# Patient Record
Sex: FEMALE | Race: WHITE | URBAN - METROPOLITAN AREA
[De-identification: names, ages, dates, MRNs, and addresses within clinical notes are randomized per-mention and may not be internally consistent; named-entity substitution may affect disease eponyms.]

---

## 2017-07-05 ENCOUNTER — TRANSFERRED RECORDS (OUTPATIENT)
Dept: HEALTH INFORMATION MANAGEMENT | Facility: CLINIC | Age: 42
End: 2017-07-05

## 2018-01-05 NOTE — TELEPHONE ENCOUNTER
Received Imaging From: [facility]    Image Type (x): Disc:___  Pacs:_x_      Exam Date/Name: XR R Knee 7/5/17  XR L Hip 7/5/17  XR L Hip Injection 9/15/17  MRI L Hip 8/9/17     Comments: henriied Cely to pull

## 2018-01-05 NOTE — TELEPHONE ENCOUNTER
APPT INFO    Date /Time: 1/24/18 3PM   Reason for Appt: Bilat Secondary OA of Rt Knee   Ref Provider/Clinic: Dr. Lloyd Cheatham   Are there internal records? Yes/No?  IF YES, list clinic names: no   Are there outside records? Yes/No? Yes - see below   Patient Contact (Y/N) & Call Details: No - pt is referred   Action: CareEverywhere records reviewed. See CareEverywhere Tab.       OUTSIDE RECORDS CHECKLIST     CLINIC NAME COMMENTS REC (x) IMG (x)   [facility]    (CARE EVERYWHERE) OFFICE NOTES: 7/5/17, 8/1/17, 11/6/17, 12/4/17    RADIOLOGY:   XR R Knee 7/5/17  XR L Hip 7/5/17  XR L Hip Injection 9/15/17  MRI L Hip 8/9/17      X X

## 2018-01-12 DIAGNOSIS — M25.562 BILATERAL KNEE PAIN: Primary | ICD-10-CM

## 2018-01-12 DIAGNOSIS — M25.561 BILATERAL KNEE PAIN: Primary | ICD-10-CM

## 2018-01-23 ASSESSMENT — ENCOUNTER SYMPTOMS
MUSCLE CRAMPS: 0
TINGLING: 0
LOSS OF CONSCIOUSNESS: 0
MYALGIAS: 1
SEIZURES: 0
HEADACHES: 1
STIFFNESS: 1
MEMORY LOSS: 0
TREMORS: 0
PARALYSIS: 0
NECK PAIN: 1
NUMBNESS: 0
DISTURBANCES IN COORDINATION: 0
JOINT SWELLING: 0
DIZZINESS: 0
BACK PAIN: 1
SPEECH CHANGE: 0
WEAKNESS: 0
ARTHRALGIAS: 1
MUSCLE WEAKNESS: 0

## 2018-01-23 ASSESSMENT — ACTIVITIES OF DAILY LIVING (ADL): FUNCTION,_DAILY_LIVING_SCORE: 45.58

## 2018-01-23 ASSESSMENT — KOOS JR: HOW SEVERE IS YOUR KNEE STIFFNESS AFTER FIRST WAKING IN MORNING: MODERATE

## 2018-01-24 ENCOUNTER — OFFICE VISIT (OUTPATIENT)
Dept: ORTHOPEDICS | Facility: CLINIC | Age: 43
End: 2018-01-24
Payer: COMMERCIAL

## 2018-01-24 ENCOUNTER — PRE VISIT (OUTPATIENT)
Dept: ORTHOPEDICS | Facility: CLINIC | Age: 43
End: 2018-01-24

## 2018-01-24 VITALS — HEIGHT: 66 IN | BODY MASS INDEX: 37.93 KG/M2 | WEIGHT: 236 LBS

## 2018-01-24 DIAGNOSIS — M17.0 PRIMARY OSTEOARTHRITIS OF BOTH KNEES: Primary | ICD-10-CM

## 2018-01-24 PROBLEM — M17.31 POST-TRAUMATIC OSTEOARTHRITIS OF RIGHT KNEE: Status: ACTIVE | Noted: 2017-07-05

## 2018-01-24 PROBLEM — F40.240 CLAUSTROPHOBIA: Status: ACTIVE | Noted: 2017-08-01

## 2018-01-24 PROBLEM — M70.62 TROCHANTERIC BURSITIS OF LEFT HIP: Status: ACTIVE | Noted: 2017-07-05

## 2018-01-24 RX ORDER — SUMATRIPTAN 100 MG/1
TABLET, FILM COATED ORAL
COMMUNITY
Start: 2017-09-05

## 2018-01-24 RX ORDER — LEVONORGESTREL AND ETHINYL ESTRADIOL 0.15-0.03
KIT ORAL
COMMUNITY
Start: 2017-12-30

## 2018-01-24 NOTE — NURSING NOTE
"Reason For Visit:   Chief Complaint   Patient presents with     Consult     Bilateral knee and bilateral hip osteoarthritis.        Primary MD: Xuan Bowen        Ht 1.676 m (5' 6\")  Wt 107 kg (236 lb)  BMI 38.09 kg/m2      Current Outpatient Prescriptions   Medication     CITALOPRAM HYDROBROMIDE PO     levonorgestrel-ethinyl estradiol (SETLAKIN) 0.15-0.03 MG per tablet     SUMAtriptan (IMITREX) 100 MG tablet     No current facility-administered medications for this visit.           Allergies   Allergen Reactions     Morphine Nausea and Vomiting           Rochelle Langston LPN    "

## 2018-01-24 NOTE — MR AVS SNAPSHOT
After Visit Summary   1/24/2018    Xuan Harris    MRN: [id number]           Patient Information     Date Of Birth          1975        Visit Information        Provider Department      1/24/2018 3:00 PM Henri Vila MD [facility]        Today's Diagnoses     Primary osteoarthritis of both knees    -  1       Follow-ups after your visit        Additional Services     PHYSICAL THERAPY REFERRAL (External-Prints)       Physical Therapy Referral                  Who to contact     Please call your clinic at [phone] to:    Ask questions about your health    Make or cancel appointments    Discuss your medicines    Learn about your test results    Speak to your doctor   If you have compliments or concerns about an experience at your clinic, or if you wish to file a complaint, please contact [facility] Patient Relations at [phone] or email us at [email or web address]         Additional Information About Your Visit        MyChart Information     ????t gives you secure access to your electronic health record. If you see a primary care provider, you can also send messages to your care team and make appointments. If you have questions, please call your primary care clinic.  If you do not have a primary care provider, please call [phone] and they will assist you.      [facility] is an electronic gateway that provides easy, online access to your medical records. With [facility], you can request a clinic appointment, read your test results, renew a prescription or communicate with your care team.     To access your existing account, please contact your [facility] Clinic or call [phone] for assistance.        Care EveryWhere ID     This is your Care EveryWhere ID. This could be used by other organizations to access your [place] medical records  [id number]        Your Vitals Were     Height BMI (Body Mass  "Index)                1.676 m (5' 6\") 38.09 kg/m2           Blood Pressure from Last 3 Encounters:   No data found for BP    Weight from Last 3 Encounters:   01/24/18 107 kg (236 lb)              We Performed the Following     PHYSICAL THERAPY REFERRAL (External-Prints)        Primary Care Provider Office Phone # Fax #    Xuan Bowen [phone]       [facility] [address]  [place] [address]        Equal Access to Services     ZENAIDA HUMPHRIES : Hadii aad ku hadasho Soomaali, waaxda luqadaha, qaybta kaalmada adeegyada, waxay idiin hayaan adeeg kharash la'sunnin . So [facility] [phone].    ATENCIÓN: Si habla español, tiene a hernandez disposición servicios gratuitos de asistencia lingüística. [facility] [phone].    We comply with applicable federal civil rights laws and [place] laws. We do not discriminate on the basis of race, color, national origin, age, disability, sex, sexual orientation, or gender identity.            Thank you!     Thank you for choosing [facility]  for your care. Our goal is always to provide you with excellent care. Hearing back from our patients is one way we can continue to improve our services. Please take a few minutes to complete the written survey that you may receive in the mail after your visit with us. Thank you!             Your Updated Medication List - Protect others around you: Learn how to safely use, store and throw away your medicines at www.disposemymeds.org.          This list is accurate as of 1/24/18  4:19 PM.  Always use your most recent med list.                   Brand Name Dispense Instructions for use Diagnosis    CITALOPRAM HYDROBROMIDE PO           SETLAKIN 0.15-0.03 MG per tablet   Generic drug:  levonorgestrel-ethinyl estradiol           SUMAtriptan 100 MG tablet    IMITREX            "

## 2018-01-24 NOTE — LETTER
1/24/2018       RE: Xuan Harris  [facility] [facility] [address]     Dear Colleague,    Thank you for referring your patient, Xuan Harris, to the [facility] at [facility]. Please see a copy of my visit note below.    [facility], Orthopaedic Surgery, Arthritis, Hip and Knee Replacement    Xuan Harris MRN# [id number]   Age: 42 year old YOB: 1975     Requesting physician: Lloyd Cheatham              History of Present Illness:   Xuan Harris is a 42 year old year old female who presents today for evaluation and management of bilateral knee and left hip pain.    Xuan is a very pleasant 42-year-old woman with bilateral knee and left hip pain. With regards to her hip she notes that the pain is in the groin as well as the lateral aspect of her hip. She has been doing some strengthening on her own. She has also had a prior left hip intra-articular injection, which helped with the symptoms for a few months. She deals with the symptoms quite well and is still able to do activities like playing volleyball and softball.    She also notes bilateral knee pain. She had a left anterior cruciate ligament reconstruction in 1993. She localizes the pain to the medial aspect of her bilateral knees. The pain is worse with weightbearing activities. She has not done any formal physical therapy. She had prior intra-articular injections which helped with the pain temporarily. Otherwise she has been doing ice, rest, heat and Tylenol. There are occasional times and the pain is very severe and greater limits her ability to do normal weightbearing activities.           Past Medical History:     Patient Active Problem List   Diagnosis     Arthritis of both knees     Claustrophobia     Family history of malignant neoplasm of breast     Headache     Observation for suspected cardiovascular disease     Migraine without aura and without status migrainosus, not  "intractable     Post-traumatic osteoarthritis of right knee     Trochanteric bursitis of left hip     Twin pregnancy, delivered     No past medical history on file.  Prior history of blood clot: none  Prior history of bleeding problems: none  Prior history of anesthetic complications: none  Currently on opioids:  none  History of Diabetes: no           Past Surgical History:   No past surgical history on file.         Social History:     Social History     Social History     Marital status:      Spouse name: N/A     Number of children: N/A     Years of education: N/A     Occupational History     Not on file.     Social History Main Topics     Smoking status: Not on file     Smokeless tobacco: Not on file     Alcohol use Not on file     Drug use: Not on file     Sexual activity: Not on file     Other Topics Concern     Not on file     Social History Narrative       Non smoker  Works in [employer].           Family History:     No family history on file.    Father with knee replacements.         Medications:     Current Outpatient Prescriptions   Medication Sig     CITALOPRAM HYDROBROMIDE PO      levonorgestrel-ethinyl estradiol (SETLAKIN) 0.15-0.03 MG per tablet      SUMAtriptan (IMITREX) 100 MG tablet      No current facility-administered medications for this visit.             Review of Systems:   A comprehensive 10 point review of systems (constitutional, ENT, cardiac, peripheral vascular, lymphatic, respiratory, GI, , Musculoskeletal, skin, Neurological) was performed and found to be negative except as described in this note.     Also see intake form completed by patient.             Physical Exam:     EXAMINATION pertinent findings:   VITAL SIGNS: Height 1.676 m (5' 6\"), weight 107 kg (236 lb).  Body mass index is 38.09 kg/(m^2).  GEN: AOx3, cooperative, no distress  RESP: non labored breathing   ABD: benign   SKIN: grossly normal   LYMPHATIC: grossly normal   NEURO: grossly normal "   VASCULAR: satisfactory perfusion of all extremities  MUSCULOSKELETAL:   Gait: patient walks with a normal gait.     right painful  Knee  Overall limb alignment is: Neutral  Effusion or swelling of the knee: mild  Tenderness to palpation: yes, pain localizes to the medial joint line - no lateral or PF tenderness  ROM: 0 to 130  Pain with knee ROM: mild  Extensor lag: none  MCL stability: Pseudolaxity to valgus stress with firm end point  Lateral Stability: Stable  Lachman: Stable  Posterior stability: Stable  Pain with passive full hip range of motion: none  Prior surgical incision: none    left painful  Knee  Overall limb alignment is: Neutral  Effusion or swelling of the knee: mild  Tenderness to palpation: yes, pain localizes to the medial joint line - no PF or lateral TTP  ROM: 0 to 130  Pain with knee ROM: mild  Extensor lag: none  MCL stability: Pseudolaxity to valgus stress with firm end point  Lateral Stability: Stable  Lachman: Stable  Posterior stability: Stable  Pain with passive full hip range of motion: moderate pain localized to the groin with flexion and IR  Prior surgical incision: none    Motor: normal ehl/fhl/gs/at strength  Sensory: normal sensation in DP/SP/T/S/S distributions  Vascular: 2+ PT pulse           Data:   Imaging:   Plain radiographs of her knees were reviewed which demonstrate bilateral medial compartment arthritis. She also has mild left hip joint space narrowing consistent with early left hip arthritis.           Assessment and Plan:   Assessment:  Xuan is very pleasant 42-year-old woman with bilateral knee arthritis as well as hip arthritis. We discussed ongoing management options. Given her young age recommended optimizing all nonsurgical treatments. We discussed that she certainly could consider another injection given the pain relief she had. We also discussed the importance of working on maintaining a healthy active lifestyle and weight management. She will pursue this as  well. We discussed that if her symptoms got to the point where she has severe debilitating pain that limits her from doing normal activities of daily living, we could consider further surgical treatment options such as partial versus total knee replacement. With regards to her hip she does seem to have an aspect of bursitis as well as intra-articular pathology. I recommended she do a course of physical therapy for the hip and the knees. I also gave her exercises that she could begin doing on her own. She is in agreement with this treatment plan and will keep us updated. Her symptoms are worsening she return to clinic to discuss other ongoing management options.          Henri Vila M.D.     Arthritis and Joint Replacement  Department of Orthopaedic Surgery, [facility]  [email or web address]  [phone] (pager)           Review of Systems:     KOOS Knee Survey Assessment    Knee Outcome Survey ADL Scale (MI Bernard; RANDAL Bonilla; Manjit, FRANCOISE; Duke, FH; ELIEL Cisneros; 1998) 1/23/2018   Do you have swelling in your knee? Sometimes   Do you feel grinding, hear clicking or any other type of noise when your knee moves? Always   Does your knee catch or hang up when moving? Sometimes   Can you straighten your knee fully? Always   Can you bend your knee fully? Never   How severe is your knee joint stiffness after first wakening in the morning? Moderate   How severe is your knee stiffness after sitting, lying or resting LATER IN THE DAY? Moderate   How often do you experience knee pain? Daily   Twisting/pivoting on your knee Moderate   Straightening knee fully None   Bending knee fully Mild   Walking on flat surface Mild   Going up or down stairs Severe   At night while in bed Severe   Sitting or lying None   Standing upright Mild   Descending stairs Severe   Ascending stairs Moderate   Rising from sitting Severe   Standing Mild   Bending to floor/ an object Severe   Walking on flat  surface Mild   Getting in/out of car Severe   Going shopping Mild   Putting on socks/stockings Moderate   Rising from bed Moderate   Taking off socks/stockings Moderate   Lying in bed (turning over, maintaining knee position) Moderate   Getting in/out of bath Severe   Sitting Moderate   Getting on/off toilet Moderate   Heavy domestic duties (moving heavy boxes, scrubbing floors, etc) Severe   Light domestic duties (cooking, dusting, etc) Moderate   Squatting Extreme   Running Severe   Jumping Severe   Twisting/pivoting on your injured knee Severe   Kneeling Extreme   How often are you aware of your knee problem? Daily   Have you modified your life style to avoid potentially damaging activities to your knee? Moderately   How much are you troubled with lack of confidence in your knee? Severely   In general, how much difficulty do you have with your knee? Severe   Pain Score 61.11   Symptoms Score 42.85   Function, Daily Living Score 45.58   Sports and Rec Score 15   Quality of Life Score 31.25       Again, thank you for allowing me to participate in the care of your patient.      Sincerely,    Henri Vila MD

## 2018-01-24 NOTE — PROGRESS NOTES
[facility], Orthopaedic Surgery, Arthritis, Hip and Knee Replacement    Xuan Harris MRN# [id number]   Age: 42 year old YOB: 1975     Requesting physician: Lloyd Cheatham              History of Present Illness:   Xuan Harris is a 42 year old year old female who presents today for evaluation and management of bilateral knee and left hip pain.    Xuan is a very pleasant 42-year-old woman with bilateral knee and left hip pain. With regards to her hip she notes that the pain is in the groin as well as the lateral aspect of her hip. She has been doing some strengthening on her own. She has also had a prior left hip intra-articular injection, which helped with the symptoms for a few months. She deals with the symptoms quite well and is still able to do activities like playing volleyball and softball.    She also notes bilateral knee pain. She had a left anterior cruciate ligament reconstruction in 1993. She localizes the pain to the medial aspect of her bilateral knees. The pain is worse with weightbearing activities. She has not done any formal physical therapy. She had prior intra-articular injections which helped with the pain temporarily. Otherwise she has been doing ice, rest, heat and Tylenol. There are occasional times and the pain is very severe and greater limits her ability to do normal weightbearing activities.           Past Medical History:     Patient Active Problem List   Diagnosis     Arthritis of both knees     Claustrophobia     Family history of malignant neoplasm of breast     Headache     Observation for suspected cardiovascular disease     Migraine without aura and without status migrainosus, not intractable     Post-traumatic osteoarthritis of right knee     Trochanteric bursitis of left hip     Twin pregnancy, delivered     No past medical history on file.  Prior history of blood clot: none  Prior history of bleeding problems: none  Prior history of anesthetic  "complications: none  Currently on opioids:  none  History of Diabetes: no           Past Surgical History:   No past surgical history on file.         Social History:     Social History     Social History     Marital status:      Spouse name: N/A     Number of children: N/A     Years of education: N/A     Occupational History     Not on file.     Social History Main Topics     Smoking status: Not on file     Smokeless tobacco: Not on file     Alcohol use Not on file     Drug use: Not on file     Sexual activity: Not on file     Other Topics Concern     Not on file     Social History Narrative       Non smoker  Works in [employer].           Family History:     No family history on file.    Father with knee replacements.         Medications:     Current Outpatient Prescriptions   Medication Sig     CITALOPRAM HYDROBROMIDE PO      levonorgestrel-ethinyl estradiol (SETLAKIN) 0.15-0.03 MG per tablet      SUMAtriptan (IMITREX) 100 MG tablet      No current facility-administered medications for this visit.             Review of Systems:   A comprehensive 10 point review of systems (constitutional, ENT, cardiac, peripheral vascular, lymphatic, respiratory, GI, , Musculoskeletal, skin, Neurological) was performed and found to be negative except as described in this note.     Also see intake form completed by patient.             Physical Exam:     EXAMINATION pertinent findings:   VITAL SIGNS: Height 1.676 m (5' 6\"), weight 107 kg (236 lb).  Body mass index is 38.09 kg/(m^2).  GEN: AOx3, cooperative, no distress  RESP: non labored breathing   ABD: benign   SKIN: grossly normal   LYMPHATIC: grossly normal   NEURO: grossly normal   VASCULAR: satisfactory perfusion of all extremities  MUSCULOSKELETAL:   Gait: patient walks with a normal gait.     right painful  Knee  Overall limb alignment is: Neutral  Effusion or swelling of the knee: mild  Tenderness to palpation: yes, pain localizes to the medial " joint line - no lateral or PF tenderness  ROM: 0 to 130  Pain with knee ROM: mild  Extensor lag: none  MCL stability: Pseudolaxity to valgus stress with firm end point  Lateral Stability: Stable  Lachman: Stable  Posterior stability: Stable  Pain with passive full hip range of motion: none  Prior surgical incision: none    left painful  Knee  Overall limb alignment is: Neutral  Effusion or swelling of the knee: mild  Tenderness to palpation: yes, pain localizes to the medial joint line - no PF or lateral TTP  ROM: 0 to 130  Pain with knee ROM: mild  Extensor lag: none  MCL stability: Pseudolaxity to valgus stress with firm end point  Lateral Stability: Stable  Lachman: Stable  Posterior stability: Stable  Pain with passive full hip range of motion: moderate pain localized to the groin with flexion and IR  Prior surgical incision: none    Motor: normal ehl/fhl/gs/at strength  Sensory: normal sensation in DP/SP/T/S/S distributions  Vascular: 2+ PT pulse           Data:   Imaging:   Plain radiographs of her knees were reviewed which demonstrate bilateral medial compartment arthritis. She also has mild left hip joint space narrowing consistent with early left hip arthritis.           Assessment and Plan:   Assessment:  Xuan is very pleasant 42-year-old woman with bilateral knee arthritis as well as hip arthritis. We discussed ongoing management options. Given her young age recommended optimizing all nonsurgical treatments. We discussed that she certainly could consider another injection given the pain relief she had. We also discussed the importance of working on maintaining a healthy active lifestyle and weight management. She will pursue this as well. We discussed that if her symptoms got to the point where she has severe debilitating pain that limits her from doing normal activities of daily living, we could consider further surgical treatment options such as partial versus total knee replacement. With regards to  her hip she does seem to have an aspect of bursitis as well as intra-articular pathology. I recommended she do a course of physical therapy for the hip and the knees. I also gave her exercises that she could begin doing on her own. She is in agreement with this treatment plan and will keep us updated. Her symptoms are worsening she return to clinic to discuss other ongoing management options.          Henri Vila M.D.     Arthritis and Joint Replacement  Department of Orthopaedic Surgery, [facility]  [email or web address]  [phone] (pager)           Review of Systems:     KOOS Knee Survey Assessment    Knee Outcome Survey ADL Scale (Joana, MI; RANDAL Bonilla; Manjit, RS; Duke, CASH; ELIEL Cisneros; Priti) 1/23/2018   Do you have swelling in your knee? Sometimes   Do you feel grinding, hear clicking or any other type of noise when your knee moves? Always   Does your knee catch or hang up when moving? Sometimes   Can you straighten your knee fully? Always   Can you bend your knee fully? Never   How severe is your knee joint stiffness after first wakening in the morning? Moderate   How severe is your knee stiffness after sitting, lying or resting LATER IN THE DAY? Moderate   How often do you experience knee pain? Daily   Twisting/pivoting on your knee Moderate   Straightening knee fully None   Bending knee fully Mild   Walking on flat surface Mild   Going up or down stairs Severe   At night while in bed Severe   Sitting or lying None   Standing upright Mild   Descending stairs Severe   Ascending stairs Moderate   Rising from sitting Severe   Standing Mild   Bending to floor/ an object Severe   Walking on flat surface Mild   Getting in/out of car Severe   Going shopping Mild   Putting on socks/stockings Moderate   Rising from bed Moderate   Taking off socks/stockings Moderate   Lying in bed (turning over, maintaining knee position) Moderate   Getting in/out of bath Severe   Sitting  Moderate   Getting on/off toilet Moderate   Heavy domestic duties (moving heavy boxes, scrubbing floors, etc) Severe   Light domestic duties (cooking, dusting, etc) Moderate   Squatting Extreme   Running Severe   Jumping Severe   Twisting/pivoting on your injured knee Severe   Kneeling Extreme   How often are you aware of your knee problem? Daily   Have you modified your life style to avoid potentially damaging activities to your knee? Moderately   How much are you troubled with lack of confidence in your knee? Severely   In general, how much difficulty do you have with your knee? Severe   Pain Score 61.11   Symptoms Score 42.85   Function, Daily Living Score 45.58   Sports and Rec Score 15   Quality of Life Score 31.25        Answers for HPI/ROS submitted by the patient on 1/23/2018   General Symptoms: No  Skin Symptoms: No  HENT Symptoms: No  EYE SYMPTOMS: No  HEART SYMPTOMS: No  LUNG SYMPTOMS: No  INTESTINAL SYMPTOMS: No  URINARY SYMPTOMS: No  GYNECOLOGIC SYMPTOMS: No  BREAST SYMPTOMS: No  SKELETAL SYMPTOMS: Yes  BLOOD SYMPTOMS: No  NERVOUS SYSTEM SYMPTOMS: Yes  MENTAL HEALTH SYMPTOMS: No  Back pain: Yes  Muscle aches: Yes  Neck pain: Yes  Swollen joints: No  Joint pain: Yes  Bone pain: No  Muscle cramps: No  Muscle weakness: No  Joint stiffness: Yes  Bone fracture: No  Trouble with coordination: No  Dizziness or trouble with balance: No  Fainting or black-out spells: No  Memory loss: No  Headache: Yes  Seizures: No  Speech problems: No  Tingling: No  Tremor: No  Weakness: No  Difficulty walking: No  Paralysis: No  Numbness: No

## 2018-01-28 ENCOUNTER — HEALTH MAINTENANCE LETTER (OUTPATIENT)
Age: 43
End: 2018-01-28

## 2020-03-11 ENCOUNTER — HEALTH MAINTENANCE LETTER (OUTPATIENT)
Age: 45
End: 2020-03-11

## 2020-12-27 ENCOUNTER — HEALTH MAINTENANCE LETTER (OUTPATIENT)
Age: 45
End: 2020-12-27

## 2021-03-06 ENCOUNTER — HEALTH MAINTENANCE LETTER (OUTPATIENT)
Age: 46
End: 2021-03-06

## 2021-04-25 ENCOUNTER — HEALTH MAINTENANCE LETTER (OUTPATIENT)
Age: 46
End: 2021-04-25

## 2021-10-09 ENCOUNTER — HEALTH MAINTENANCE LETTER (OUTPATIENT)
Age: 46
End: 2021-10-09

## 2022-03-26 ENCOUNTER — HEALTH MAINTENANCE LETTER (OUTPATIENT)
Age: 47
End: 2022-03-26

## 2022-05-21 ENCOUNTER — HEALTH MAINTENANCE LETTER (OUTPATIENT)
Age: 47
End: 2022-05-21

## 2022-09-17 ENCOUNTER — HEALTH MAINTENANCE LETTER (OUTPATIENT)
Age: 47
End: 2022-09-17

## 2023-05-06 ENCOUNTER — HEALTH MAINTENANCE LETTER (OUTPATIENT)
Age: 48
End: 2023-05-06

## 2023-06-04 ENCOUNTER — HEALTH MAINTENANCE LETTER (OUTPATIENT)
Age: 48
End: 2023-06-04